# Patient Record
Sex: MALE | Employment: UNEMPLOYED | URBAN - METROPOLITAN AREA
[De-identification: names, ages, dates, MRNs, and addresses within clinical notes are randomized per-mention and may not be internally consistent; named-entity substitution may affect disease eponyms.]

---

## 2019-08-03 ENCOUNTER — HOSPITAL ENCOUNTER (EMERGENCY)
Age: 34
Discharge: HOME | End: 2019-08-03
Attending: EMERGENCY MEDICINE
Payer: COMMERCIAL

## 2019-08-03 VITALS
DIASTOLIC BLOOD PRESSURE: 83 MMHG | OXYGEN SATURATION: 99 % | HEART RATE: 84 BPM | TEMPERATURE: 98.5 F | SYSTOLIC BLOOD PRESSURE: 117 MMHG | RESPIRATION RATE: 16 BRPM

## 2019-08-03 DIAGNOSIS — F30.9 MANIA (HCC): ICD-10-CM

## 2019-08-03 DIAGNOSIS — G47.00 INSOMNIA, UNSPECIFIED TYPE: Primary | ICD-10-CM

## 2019-08-03 LAB
ALBUMIN SERPL-MCNC: 4.2 G/DL (ref 3.5–5)
ALBUMIN/GLOB SERPL: 1.4 {RATIO} (ref 1.1–2.2)
ALP SERPL-CCNC: 53 U/L (ref 45–117)
ALT SERPL-CCNC: 36 U/L (ref 12–78)
AMPHET UR QL SCN: NEGATIVE
ANION GAP SERPL CALC-SCNC: 6 MMOL/L (ref 5–15)
APAP SERPL-MCNC: <2 UG/ML (ref 10–30)
APPEARANCE UR: CLEAR
AST SERPL-CCNC: 77 U/L (ref 15–37)
BACTERIA URNS QL MICRO: NEGATIVE /HPF
BARBITURATES UR QL SCN: NEGATIVE
BASOPHILS # BLD: 0 K/UL (ref 0–0.1)
BASOPHILS NFR BLD: 0 % (ref 0–1)
BENZODIAZ UR QL: NEGATIVE
BILIRUB SERPL-MCNC: 1.1 MG/DL (ref 0.2–1)
BILIRUB UR QL: NEGATIVE
BUN SERPL-MCNC: 20 MG/DL (ref 6–20)
BUN/CREAT SERPL: 25 (ref 12–20)
CALCIUM SERPL-MCNC: 9.2 MG/DL (ref 8.5–10.1)
CANNABINOIDS UR QL SCN: NEGATIVE
CHLORIDE SERPL-SCNC: 105 MMOL/L (ref 97–108)
CO2 SERPL-SCNC: 28 MMOL/L (ref 21–32)
COCAINE UR QL SCN: NEGATIVE
COLOR UR: ABNORMAL
COMMENT, HOLDF: NORMAL
CREAT SERPL-MCNC: 0.81 MG/DL (ref 0.7–1.3)
DIFFERENTIAL METHOD BLD: ABNORMAL
DRUG SCRN COMMENT,DRGCM: NORMAL
EOSINOPHIL # BLD: 0 K/UL (ref 0–0.4)
EOSINOPHIL NFR BLD: 0 % (ref 0–7)
EPITH CASTS URNS QL MICRO: ABNORMAL /LPF
ERYTHROCYTE [DISTWIDTH] IN BLOOD BY AUTOMATED COUNT: 12.6 % (ref 11.5–14.5)
ETHANOL SERPL-MCNC: <10 MG/DL
GLOBULIN SER CALC-MCNC: 3 G/DL (ref 2–4)
GLUCOSE SERPL-MCNC: 103 MG/DL (ref 65–100)
GLUCOSE UR STRIP.AUTO-MCNC: 100 MG/DL
HCT VFR BLD AUTO: 44.2 % (ref 36.6–50.3)
HGB BLD-MCNC: 15.2 G/DL (ref 12.1–17)
HGB UR QL STRIP: ABNORMAL
IMM GRANULOCYTES # BLD AUTO: 0 K/UL (ref 0–0.04)
IMM GRANULOCYTES NFR BLD AUTO: 0 % (ref 0–0.5)
KETONES UR QL STRIP.AUTO: NEGATIVE MG/DL
LEUKOCYTE ESTERASE UR QL STRIP.AUTO: NEGATIVE
LYMPHOCYTES # BLD: 1.5 K/UL (ref 0.8–3.5)
LYMPHOCYTES NFR BLD: 12 % (ref 12–49)
MCH RBC QN AUTO: 29.4 PG (ref 26–34)
MCHC RBC AUTO-ENTMCNC: 34.4 G/DL (ref 30–36.5)
MCV RBC AUTO: 85.5 FL (ref 80–99)
METHADONE UR QL: NEGATIVE
MONOCYTES # BLD: 1.3 K/UL (ref 0–1)
MONOCYTES NFR BLD: 10 % (ref 5–13)
NEUTS SEG # BLD: 9.4 K/UL (ref 1.8–8)
NEUTS SEG NFR BLD: 78 % (ref 32–75)
NITRITE UR QL STRIP.AUTO: NEGATIVE
NRBC # BLD: 0 K/UL (ref 0–0.01)
NRBC BLD-RTO: 0 PER 100 WBC
OPIATES UR QL: NEGATIVE
PCP UR QL: NEGATIVE
PH UR STRIP: 6 [PH] (ref 5–8)
PLATELET # BLD AUTO: 160 K/UL (ref 150–400)
PMV BLD AUTO: 9.8 FL (ref 8.9–12.9)
POTASSIUM SERPL-SCNC: 3.8 MMOL/L (ref 3.5–5.1)
PROT SERPL-MCNC: 7.2 G/DL (ref 6.4–8.2)
PROT UR STRIP-MCNC: ABNORMAL MG/DL
RBC # BLD AUTO: 5.17 M/UL (ref 4.1–5.7)
RBC #/AREA URNS HPF: ABNORMAL /HPF (ref 0–5)
SALICYLATES SERPL-MCNC: <1.7 MG/DL (ref 2.8–20)
SAMPLES BEING HELD,HOLD: NORMAL
SODIUM SERPL-SCNC: 139 MMOL/L (ref 136–145)
SP GR UR REFRACTOMETRY: 1.02 (ref 1–1.03)
UR CULT HOLD, URHOLD: NORMAL
UROBILINOGEN UR QL STRIP.AUTO: 0.2 EU/DL (ref 0.2–1)
WBC # BLD AUTO: 12.2 K/UL (ref 4.1–11.1)
WBC URNS QL MICRO: ABNORMAL /HPF (ref 0–4)

## 2019-08-03 PROCEDURE — 99283 EMERGENCY DEPT VISIT LOW MDM: CPT

## 2019-08-03 PROCEDURE — 99284 EMERGENCY DEPT VISIT MOD MDM: CPT

## 2019-08-03 PROCEDURE — 36415 COLL VENOUS BLD VENIPUNCTURE: CPT

## 2019-08-03 PROCEDURE — 80053 COMPREHEN METABOLIC PANEL: CPT

## 2019-08-03 PROCEDURE — 81001 URINALYSIS AUTO W/SCOPE: CPT

## 2019-08-03 PROCEDURE — 80307 DRUG TEST PRSMV CHEM ANLYZR: CPT

## 2019-08-03 PROCEDURE — 85025 COMPLETE CBC W/AUTO DIFF WBC: CPT

## 2019-08-03 PROCEDURE — 90791 PSYCH DIAGNOSTIC EVALUATION: CPT

## 2019-08-03 NOTE — BSMART NOTE
Pt is a 33 y/o male transported to the ED by mother, father and brother. Pt is asthmatic but otherwise unremarkable medical and psychiatric history. Family reports pt has not slept for 3-4 days and has been exhibiting change in mental status. Pt told ED staff he is feeling \"jittery\", \"unsettled\" and unable to sleep. Pt denied SI/HI but stated that this past week has been \"rough: and he is \"trying to work through some things. \" Family report pt is devout [employer] and his jeanette his a source of strength but family notes evidence o to him but lately there is evidence of Jain preoccupation. Pt reportedly told his family this week that he has been \"in so much darkness\", feels Fannie Chris is out to get him and all around him and he prays to God but God won't save him. \" Pt's family reported that approximately 1 day ago pt told them he was seeing \"spiders crawling across the floor. \" In addition to insomnia, pt has not been eating for several days. Pt told ED staff he was 38 y/o but he is actually 33 y/o. He did not know his . Upon assessment, pt knew his  but told writer incorrect age (37). Pt was disoriented to time, day and month. He appeared withdrawn with flat affect. Speech was slow, monotone and impoverished. Pt appeared confused and demonstrated evidence of thought blocking. He was distracted throughout the interview in such a manner that he appeared to be experiencing internal stimuli. Pt was often unresponsive to questions and when he did respond, he would do so only after long pauses and asking writer to repeat the question. When questions were repeated, pt's responses were often not relevant to the question. Shortly after writer left the room, pt became tearful and appeared to be hyperventilating. Writer returned to the room to observe this. Pt asked his family to pray for him then began vacillating between crying and laughing.  
Pt repeatedly told writer he was\"confused\" and \"trying to work through things. \" He has difficulty explaining what he meant by latter statement. He did share that he has been in distress and overwhelmed in his efforts to complete college coursework. 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 Pt reportedly is known to deprive himself of sleep to meet coursework deadlines, but has never been unable to fall asleep until this week. Family report pt's baseline is jovial, animated with spontaneous speech. Family report this week pt has engaged in bizarre behaviors. One day he refused to verbally communicate with family and insisted they write down everything they wanted to say to him, but when they did he responded with only \"yes\" and \"no. \" Last night pt took his computer apart and insisted family help him remove everything from his bedroom; he could not articulate what motivated him to do this. Family vacillated several times regarding whether to pursue psychiatric hospitalization for pt or try to monitor him at home. Mother explained she brought pt to the ED hoping there was a medical explanation for his presentation and wanted him to be discharged with sleep meds. Writer consulted with ED provider Dr. Rishi Garcia who agreed that the recommendation is for pt to be admittedly psychiatrically, thus he will not send pt home with sleep meds. Mother asked about pt being admitted voluntarily but writer explained that pt does not have capacity to consent and if hospitalization is pursued, pt must be prescreened for TDO. Mother wanted pt admitted at [facility] but currently there are no available beds. Mother wanted pt admitted to a facility in [place] but writer explained that if pt is under TDO, the [facility] will search for local placement first, but they are ultimately obligated to place him in the next available bed regardless of location.  To prevent [facility] involvement, mother spent significant amount of time trying to convince pt to agree to voluntary admission despite his repeated refusal to do so. Pt repeatedly stated he wanted to go home. He told writer, \"You are not you. You are different. \" Pt then called his family \"deceivers. \" Mother told Ruth Iglesias she wanted pt evaluated for TDO but changed her mind while [place] [facility] was en route, stating that she wanted to take pt home and take responsibility for his safety. Pt's adult brother agreed to help mother with this. Mother will call 911 or transport pt to an ED if his presentation worsens or does not improve.

## 2019-08-03 NOTE — ED PROVIDER NOTES
34 y.o. male with no significant past medical history who presents from home with chief complaint of sleep disturbance. Patient reports for ~2 days he has not slept and he is currently feeling \"gittery\" and \"unsettled. \" Patient presents to [facility] ED with persisting feelings of \"gittery\" and \"unsettled,\" noting he has still not slept. Patient states he is not depressed, however this past week has \"been rough, and he's try to work through some things. \" Patients family reports patient has been stating \"I'm in so much darkness, evil is out to get him, he prays to God but God won't save him. \" Patient's family endorses patient had a visual hallucination ~1 day ago described as Ashley Esquivel was seeing spiders crawling across the floor. \" Patient endorses accompanying sx of loss of appetite and sleep loss. Patient states he is not working. Patient denies hx of depression. Patient denies SI and HI. Patient denies hx of psych w/u. Patient denies use of medication for psych issues. Patient denies auditory hallucinations. There are no other acute medical concerns at this time. Social hx: No Tobacco use, No EtOH use, No illicit drug use. PCP: Sophia Hinton MD    Note written by Buddy Seo, as dictated by Slade Valderrama MD 2:15 PM       The history is provided by the patient and a relative. No  was used. Past Medical History:   Diagnosis Date    Asthma        History reviewed. No pertinent surgical history. History reviewed. No pertinent family history.     Social History     Socioeconomic History    Marital status: SINGLE     Spouse name: Not on file    Number of children: Not on file    Years of education: Not on file    Highest education level: Not on file   Occupational History    Not on file   Social Needs    Financial resource strain: Not on file    Food insecurity:     Worry: Not on file     Inability: Not on file    Transportation needs:     Medical: Not on file Non-medical: Not on file   Tobacco Use    Smoking status: Never Smoker    Smokeless tobacco: Never Used   Substance and Sexual Activity    Alcohol use: Not Currently    Drug use: Not Currently    Sexual activity: Not on file   Lifestyle    Physical activity:     Days per week: Not on file     Minutes per session: Not on file    Stress: Not on file   Relationships    Social connections:     Talks on phone: Not on file     Gets together: Not on file     Attends Voodoo service: Not on file     Active member of club or organization: Not on file     Attends meetings of clubs or organizations: Not on file     Relationship status: Not on file    Intimate partner violence:     Fear of current or ex partner: Not on file     Emotionally abused: Not on file     Physically abused: Not on file     Forced sexual activity: Not on file   Other Topics Concern    Not on file   Social History Narrative    Not on file         ALLERGIES: Patient has no known allergies. Review of Systems   Constitutional: Positive for appetite change. Negative for activity change and fatigue. HENT: Negative for ear pain, facial swelling, sore throat and trouble swallowing. Eyes: Negative for pain, discharge and visual disturbance. Respiratory: Negative for chest tightness, shortness of breath and wheezing. Cardiovascular: Negative for chest pain and palpitations. Gastrointestinal: Negative for abdominal pain, blood in stool, nausea and vomiting. Genitourinary: Negative for difficulty urinating, flank pain and hematuria. Musculoskeletal: Negative for arthralgias, joint swelling, myalgias and neck pain. Skin: Negative for color change and rash. Neurological: Negative for dizziness, weakness, numbness and headaches. Hematological: Negative for adenopathy. Does not bruise/bleed easily. Psychiatric/Behavioral: Positive for behavioral problems, hallucinations and sleep disturbance.  Negative for confusion and suicidal ideas. All other systems reviewed and are negative. Vitals:    08/03/19 1335   BP: 129/87   Pulse: (!) 104   Resp: 18   Temp: 98.3 °F (36.8 °C)   SpO2: 99%            Physical Exam   Constitutional: He appears well-developed and well-nourished. HENT:   Head: Normocephalic. Eyes: Conjunctivae are normal. No scleral icterus. Neck: No JVD present. No tracheal deviation present. Cardiovascular: Normal rate. Pulmonary/Chest: Effort normal.   Abdominal: He exhibits no distension. Musculoskeletal: He exhibits no edema. Neurological: He is alert. oriented   Skin: No rash noted. No pallor. Psychiatric: Cognition and memory are normal. He exhibits a depressed mood. Behavior quiet, cooperative. Seems fatigued towards end of giving hx and ignored a few questions. Nursing note and vitals reviewed.    Note written by Buddy White, as dictated by Glenis Saucedo MD 2:15 PM    MDM       Procedures

## 2019-08-04 NOTE — ED NOTES
Verbal shift change report received from [place], RN(offgoing nurse). Report included the following information SBAR, ED Summary, MAR and Recent Results. Pt sitting at edge of stretcher, flat affect, in no signs of distress, eating guillaume grahams. Mother at the bedside. Offered pt dinner tray, he declined.

## 2019-08-04 NOTE — ED NOTES
Reassessed patient. He is eating. No acute concerns from him or family. They have been offered and recommended psychiatric admission but declined. No indication for holding him against his will with family members accepting responsibility for further follow up which may include neurology and psychiatry or return to the ED if symptoms worsen.